# Patient Record
Sex: MALE | Race: OTHER | NOT HISPANIC OR LATINO | URBAN - METROPOLITAN AREA
[De-identification: names, ages, dates, MRNs, and addresses within clinical notes are randomized per-mention and may not be internally consistent; named-entity substitution may affect disease eponyms.]

---

## 2019-07-26 ENCOUNTER — EMERGENCY (EMERGENCY)
Facility: HOSPITAL | Age: 24
LOS: 1 days | Discharge: HOME | End: 2019-07-26
Attending: EMERGENCY MEDICINE
Payer: MEDICAID

## 2019-07-26 VITALS
RESPIRATION RATE: 20 BRPM | HEART RATE: 61 BPM | DIASTOLIC BLOOD PRESSURE: 75 MMHG | SYSTOLIC BLOOD PRESSURE: 149 MMHG | TEMPERATURE: 98 F | WEIGHT: 205.03 LBS | HEIGHT: 70 IN | OXYGEN SATURATION: 98 %

## 2019-07-26 PROCEDURE — 99284 EMERGENCY DEPT VISIT MOD MDM: CPT

## 2019-07-26 PROCEDURE — 99283 EMERGENCY DEPT VISIT LOW MDM: CPT

## 2019-07-26 PROCEDURE — 93010 ELECTROCARDIOGRAM REPORT: CPT

## 2019-07-26 PROCEDURE — 93005 ELECTROCARDIOGRAM TRACING: CPT

## 2019-07-26 NOTE — ED PROVIDER NOTE - NSFOLLOWUPINSTRUCTIONS_ED_ALL_ED_FT
1. Rest. Stay hydrated.   2. Continue your current home medications.  3. Follow-up with your medical doctor in 2-3 days.  Bring your results with you for follow-up.  4. Return to the ER if you have any new or worsening symptoms, chest pain, difficulty breathing, fevers, chills, weakness, or any other concerns.

## 2019-07-26 NOTE — ED PROVIDER NOTE - NS ED ROS FT
Constitutional: No fever or chills  Eyes: No visual changes  CV: +chest pain No lower extremity edema  Resp: +SOB no cough  GI: No abd pain. No nausea or vomiting. No diarrhea. No constipation.   : No dysuria, hematuria.   MSK: No musculoskeletal pain  Skin: No rash  Neuro: No headache. No numbness or tingling. + weakness.

## 2019-07-26 NOTE — ED PROVIDER NOTE - OBJECTIVE STATEMENT
23y/o male with no PMH presents to ED with mid sternal intermittent chest pressure radiating to throat with associated SOB during episodes. Pressure occurs while patient is resting. Also endorses total body weakness. Patient wakes up in the middle of the night regularly which has been increased the last few days. Patient has had episodes in the past which resolved on its own.   Denies current chest pressure and SOB, abdominal pain, fever, recent illness, recent travel.

## 2019-07-26 NOTE — ED PROVIDER NOTE - PROGRESS NOTE DETAILS
Patient to follow up with PCP outpatient, no active chest pain, no active SOB, EKG sinus bradycardia, patient is young and active, DC with strict return precautions given. CRISS Arenas PA-C

## 2019-07-26 NOTE — ED PROVIDER NOTE - ATTENDING CONTRIBUTION TO CARE
Patient presenting complaining of central intermittent chest pains, burning in nature.  Non exertional.  Occurring over past few days, reporting improves when he is working and is "distracted", worse at home.  Non smoker, no family history of young cardiac death or unexplained death.  No drug use.  No associated shortness of breath, palpitations, nausea.  Is reporting feeling more tired than normal recently, denying hot/cold sensation issues, appetite changes, unintentional weight loss/gain.    A 14 point review of systems is negative except as in HPI or otherwise documented.    Exam:  General: Patient well appearing, vital signs within normal limits  HEENT: airway patent with moist mucous membranes  Cardiac: regular bradycardia S1/S2 with strong peripheral pulses  Respiratory: lungs clear without respiratory distress  GI: abdomen soft, non tender, non distended  Neuro: no gross neurologic deficits  Lymph: no cervical/axillary lymphadenopathy  Skin: warm, well perfused  Psych: normal mood and affect    Patient with atypical chest pains without risk factors, PERC negative, unremarkable physical exam, EKG sinus bradycardia (however patient is young and athletic and this is likely secondary to conditioning rather than demonstrative of pathology) without ischemic changes or evidence of pericarditis.  Discussed with patient at length, clinically no evidence of life threatening pathology, will have follow up with PMD for further workup.

## 2019-07-26 NOTE — ED ADULT NURSE NOTE - OBJECTIVE STATEMENT
23yo male presents c/o chest pain and fatigue over last week. Pain increased when at rest. Pt states that he has been more tired over the last week and feels tired even after taking a nap. Endorses dark yellow urine. Pt A&Ox3. [place]. Ambulates. Respirations even/unlabored. Abd soft. No n/v/d. denies difficulty urinating. Skin WDI.

## 2021-10-01 PROBLEM — Z00.00 ENCOUNTER FOR PREVENTIVE HEALTH EXAMINATION: Status: ACTIVE | Noted: 2021-10-01

## 2021-10-05 ENCOUNTER — APPOINTMENT (OUTPATIENT)
Dept: COLORECTAL SURGERY | Facility: CLINIC | Age: 26
End: 2021-10-05

## 2021-11-02 ENCOUNTER — APPOINTMENT (OUTPATIENT)
Dept: COLORECTAL SURGERY | Facility: CLINIC | Age: 26
End: 2021-11-02

## 2021-12-13 ENCOUNTER — NON-APPOINTMENT (OUTPATIENT)
Age: 26
End: 2021-12-13

## 2021-12-14 ENCOUNTER — APPOINTMENT (OUTPATIENT)
Dept: COLORECTAL SURGERY | Facility: CLINIC | Age: 26
End: 2021-12-14
Payer: COMMERCIAL

## 2021-12-14 VITALS
DIASTOLIC BLOOD PRESSURE: 71 MMHG | WEIGHT: 208 LBS | HEIGHT: 70 IN | RESPIRATION RATE: 14 BRPM | OXYGEN SATURATION: 99 % | HEART RATE: 63 BPM | TEMPERATURE: 98.6 F | BODY MASS INDEX: 29.78 KG/M2 | SYSTOLIC BLOOD PRESSURE: 142 MMHG

## 2021-12-14 DIAGNOSIS — K60.3 ANAL FISTULA: ICD-10-CM

## 2021-12-14 DIAGNOSIS — Z78.9 OTHER SPECIFIED HEALTH STATUS: ICD-10-CM

## 2021-12-14 PROCEDURE — 46600 DIAGNOSTIC ANOSCOPY SPX: CPT

## 2021-12-14 PROCEDURE — 99203 OFFICE O/P NEW LOW 30 MIN: CPT | Mod: 25

## 2021-12-14 NOTE — HISTORY OF PRESENT ILLNESS
[FreeTextEntry1] : 27yo M pt presents for 2nd opinion about whether he has fistula or not. Back in September pt had anal pain and abscess burst and there was drainage, he then went to gastroenterologist, treated with abx. Dr said pt didn't have fistula. Pt sought another opinion from colorectal surgeon, told pt that it was a fistula. MRI last month revealed no fistula. Currently pt still has yellow pus and blood drainage coming out, and pain. Denies pain.Denies fevers or chills no nausea or vomiting. November a history of colon cancer or inflammatory bowel disease

## 2021-12-14 NOTE — PHYSICAL EXAM
[Normal Breath Sounds] : Normal breath sounds [Normal Heart Sounds] : normal heart sounds [Normal Rate and Rhythm] : normal rate and rhythm [Alert] : alert [Oriented to Person] : oriented to person [Oriented to Place] : oriented to place [Oriented to Time] : oriented to time [Calm] : calm [de-identified] : flat soft +BS NT/ND [de-identified] : well nourished male [de-identified] : NC/AT [de-identified] : +ROM [de-identified] : intact

## 2021-12-14 NOTE — CONSULT LETTER
[Dear  ___] : Dear  [unfilled], [Consult Letter:] : I had the pleasure of evaluating your patient, [unfilled]. [Please see my note below.] : Please see my note below. [Consult Closing:] : Thank you very much for allowing me to participate in the care of this patient.  If you have any questions, please do not hesitate to contact me. [Sincerely,] : Sincerely, [FreeTextEntry2] : Ever Skaggs [FreeTextEntry3] : Nathan Call MD FACS\par Chief Colon and Rectal Surgery\par [facility]

## 2021-12-14 NOTE — ASSESSMENT
[FreeTextEntry1] : Anal fistula\par -I recommended patient undergo examination under anesthesia with possible fistulotomy possible seton placement possible flap. We discussed risks and benefits of each of these procedures at length including bleeding, infection, recurrence rates and possible need for additional surgeries\par -All questions were answered\par -Patient will consider his options and call the office if he wishes to proceed\par -We discussed the possibility of inflammatory bowel disease being the cause of his anal fistula. Given the appearance of the fistula on physical exam and a completely normal rectal mucosa. 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 In addition, the patient has no change in bowel habits no weight loss and no dominant discomfort\par -We'll discuss the possibility of observation and the natural history of anal fistula disease

## 2021-12-28 ENCOUNTER — APPOINTMENT (OUTPATIENT)
Dept: SURGERY | Facility: CLINIC | Age: 26
End: 2021-12-28

## 2024-03-07 ENCOUNTER — APPOINTMENT (OUTPATIENT)
Dept: HUMAN REPRODUCTION | Facility: CLINIC | Age: 29
End: 2024-03-07

## 2024-04-16 ENCOUNTER — APPOINTMENT (OUTPATIENT)
Dept: HUMAN REPRODUCTION | Facility: CLINIC | Age: 29
End: 2024-04-16

## 2024-10-03 ENCOUNTER — APPOINTMENT (OUTPATIENT)
Dept: HUMAN REPRODUCTION | Facility: CLINIC | Age: 29
End: 2024-10-03
Payer: COMMERCIAL

## 2024-10-03 PROCEDURE — 89322 SEMEN ANAL STRICT CRITERIA: CPT

## 2024-10-30 ENCOUNTER — NON-APPOINTMENT (OUTPATIENT)
Age: 29
End: 2024-10-30

## 2024-10-31 ENCOUNTER — NON-APPOINTMENT (OUTPATIENT)
Age: 29
End: 2024-10-31

## 2024-10-31 ENCOUNTER — APPOINTMENT (OUTPATIENT)
Dept: UROLOGY | Facility: CLINIC | Age: 29
End: 2024-10-31
Payer: COMMERCIAL

## 2024-10-31 VITALS
WEIGHT: 205 LBS | HEIGHT: 72 IN | DIASTOLIC BLOOD PRESSURE: 69 MMHG | TEMPERATURE: 97.3 F | SYSTOLIC BLOOD PRESSURE: 123 MMHG | BODY MASS INDEX: 27.77 KG/M2 | HEART RATE: 54 BPM

## 2024-10-31 DIAGNOSIS — E29.1 TESTICULAR HYPOFUNCTION: ICD-10-CM

## 2024-10-31 PROCEDURE — 99204 OFFICE O/P NEW MOD 45 MIN: CPT

## 2024-10-31 PROCEDURE — G2211 COMPLEX E/M VISIT ADD ON: CPT | Mod: NC

## 2024-11-11 ENCOUNTER — NON-APPOINTMENT (OUTPATIENT)
Age: 29
End: 2024-11-11

## 2024-11-19 ENCOUNTER — NON-APPOINTMENT (OUTPATIENT)
Age: 29
End: 2024-11-19

## 2025-03-10 ENCOUNTER — APPOINTMENT (OUTPATIENT)
Dept: HUMAN REPRODUCTION | Facility: CLINIC | Age: 30
End: 2025-03-10

## 2025-03-10 PROCEDURE — 89322 SEMEN ANAL STRICT CRITERIA: CPT

## 2025-05-20 ENCOUNTER — APPOINTMENT (OUTPATIENT)
Dept: HUMAN REPRODUCTION | Facility: CLINIC | Age: 30
End: 2025-05-20

## 2025-05-20 PROCEDURE — 89322 SEMEN ANAL STRICT CRITERIA: CPT

## 2025-05-22 ENCOUNTER — APPOINTMENT (OUTPATIENT)
Dept: HUMAN REPRODUCTION | Facility: CLINIC | Age: 30
End: 2025-05-22

## 2025-07-29 NOTE — ED ADULT NURSE NOTE - CAS DISCH CONDITION
PATIENT REVIEW OF SYSTEMS     General ROS:  1.   Constitutional No   2.   Eyes No   3.   Cardiac No   4.   Respiratory No   5.   Gastrointestinal No   6.   Genitourinary No   7.   Musculoskeletal No   8.   Endocrine No   9.   Hematologic/Lymphatic No   10. Integument No   11. Neurological No   12. Allergy/Immunologic No   13. Psychiatric No     RN present in exam room during patient encounter.      Stable

## 2025-08-28 ENCOUNTER — APPOINTMENT (OUTPATIENT)
Dept: HUMAN REPRODUCTION | Facility: CLINIC | Age: 30
End: 2025-08-28